# Patient Record
Sex: MALE | Race: WHITE
[De-identification: names, ages, dates, MRNs, and addresses within clinical notes are randomized per-mention and may not be internally consistent; named-entity substitution may affect disease eponyms.]

---

## 2019-08-05 ENCOUNTER — HOSPITAL ENCOUNTER (OUTPATIENT)
Dept: HOSPITAL 18 - NAV RAD | Age: 44
Discharge: HOME | End: 2019-08-05
Payer: COMMERCIAL

## 2019-08-05 DIAGNOSIS — S91.341A: Primary | ICD-10-CM

## 2019-08-05 DIAGNOSIS — L03.115: ICD-10-CM

## 2019-08-05 NOTE — RAD
EXAM: Right foot: 3 views



INDICATIONS: Cellulitis of right foot



COMPARISON: None.



FINDINGS: Postop changes involving the first metatarsal. 2 screws transfix the proximal first metatar
sal. One of these screws appear to be fractured.

Tarsals appear intact. Metatarsals and phalanges appear intact. Mild degenerative changes seen. No fo
adelaida lysis or destructive process.



IMPRESSION: No evidence of osteomyelitis.



Reported By: Darshan Gonzalez 

Electronically Signed:  8/5/2019 2:13 PM

## 2019-12-05 ENCOUNTER — HOSPITAL ENCOUNTER (OUTPATIENT)
Dept: HOSPITAL 18 - NAV RAD | Age: 44
Discharge: HOME | End: 2019-12-05
Payer: COMMERCIAL

## 2019-12-05 DIAGNOSIS — M17.12: Primary | ICD-10-CM

## 2019-12-05 NOTE — RAD
XR Knee Lt 4 View STANDARD:



 12/5/2019 2:28 PM



CLINICAL INDICATION: Arthritis



COMPARISON: None.



FINDINGS:



Bones:  There is mild osteophytes involving the patellofemoral compartment. 



Joints: Mediolateral femoral tibial joint compartments are preserved.. 



Soft Tissue: No acute abnormality..

  

IMPRESSION: 

Mild osteoarthrosis of the left knee predominantly in the patellofemoral compartment.. 



Reported By: Partha Pappas 

Electronically Signed:  12/5/2019 2:41 PM

## 2023-07-24 ENCOUNTER — HOSPITAL ENCOUNTER (INPATIENT)
Dept: HOSPITAL 92 - SURG A | Age: 48
LOS: 9 days | Discharge: HOME | DRG: 621 | End: 2023-08-02
Attending: SPECIALIST | Admitting: SPECIALIST
Payer: COMMERCIAL

## 2023-07-24 VITALS — BODY MASS INDEX: 39.7 KG/M2

## 2023-07-24 DIAGNOSIS — G89.29: ICD-10-CM

## 2023-07-24 DIAGNOSIS — Z79.899: ICD-10-CM

## 2023-07-24 DIAGNOSIS — E66.01: Primary | ICD-10-CM

## 2023-07-24 DIAGNOSIS — Z87.891: ICD-10-CM

## 2023-07-24 DIAGNOSIS — E78.00: ICD-10-CM

## 2023-07-24 DIAGNOSIS — M19.90: ICD-10-CM

## 2023-07-24 DIAGNOSIS — Z98.890: ICD-10-CM

## 2023-07-24 PROCEDURE — A4649 SURGICAL SUPPLIES: HCPCS

## 2023-07-24 PROCEDURE — 85025 COMPLETE CBC W/AUTO DIFF WBC: CPT

## 2023-07-24 PROCEDURE — C9113 INJ PANTOPRAZOLE SODIUM, VIA: HCPCS

## 2023-07-24 PROCEDURE — 36415 COLL VENOUS BLD VENIPUNCTURE: CPT

## 2023-07-24 PROCEDURE — 80053 COMPREHEN METABOLIC PANEL: CPT

## 2023-07-24 PROCEDURE — 88307 TISSUE EXAM BY PATHOLOGIST: CPT

## 2023-07-24 PROCEDURE — 80048 BASIC METABOLIC PNL TOTAL CA: CPT

## 2023-07-24 PROCEDURE — S0020 INJECTION, BUPIVICAINE HYDRO: HCPCS

## 2023-08-01 LAB
ALBUMIN SERPL BCG-MCNC: 4.5 G/DL (ref 3.5–5)
ALP SERPL-CCNC: 78 U/L (ref 40–110)
ALT SERPL W P-5'-P-CCNC: 36 U/L (ref 8–55)
ANION GAP SERPL CALC-SCNC: 13 MMOL/L (ref 10–20)
AST SERPL-CCNC: 27 U/L (ref 5–34)
BILIRUB SERPL-MCNC: 1 MG/DL (ref 0.2–1.2)
BUN SERPL-MCNC: 15 MG/DL (ref 8.9–20.6)
CALCIUM SERPL-MCNC: 9.7 MG/DL (ref 7.8–10.44)
CHLORIDE SERPL-SCNC: 105 MMOL/L (ref 98–107)
CO2 SERPL-SCNC: 26 MMOL/L (ref 22–29)
CREAT CL PREDICTED SERPL C-G-VRATE: 176 ML/MIN (ref 70–130)
GLOBULIN SER CALC-MCNC: 2.6 G/DL (ref 2.4–3.5)
GLUCOSE SERPL-MCNC: 94 MG/DL (ref 70–105)
POTASSIUM SERPL-SCNC: 3.8 MMOL/L (ref 3.5–5.1)
SODIUM SERPL-SCNC: 140 MMOL/L (ref 136–145)

## 2023-08-01 PROCEDURE — 0DB64Z3 EXCISION OF STOMACH, PERCUTANEOUS ENDOSCOPIC APPROACH, VERTICAL: ICD-10-PCS | Performed by: SPECIALIST

## 2023-08-01 RX ADMIN — POTASSIUM CHLORIDE, DEXTROSE MONOHYDRATE AND SODIUM CHLORIDE SCH MLS: 150; 5; 450 INJECTION, SOLUTION INTRAVENOUS at 19:50

## 2023-08-01 RX ADMIN — POTASSIUM CHLORIDE, DEXTROSE MONOHYDRATE AND SODIUM CHLORIDE SCH: 150; 5; 450 INJECTION, SOLUTION INTRAVENOUS at 17:06

## 2023-08-02 VITALS — SYSTOLIC BLOOD PRESSURE: 146 MMHG | TEMPERATURE: 98.1 F | DIASTOLIC BLOOD PRESSURE: 89 MMHG

## 2023-08-02 LAB
ANION GAP SERPL CALC-SCNC: 12 MMOL/L (ref 10–20)
BASOPHILS # BLD AUTO: 0 THOU/UL (ref 0–0.2)
BASOPHILS NFR BLD AUTO: 0.1 % (ref 0–1)
BUN SERPL-MCNC: 11 MG/DL (ref 8.9–20.6)
CALCIUM SERPL-MCNC: 8.8 MG/DL (ref 7.8–10.44)
CHLORIDE SERPL-SCNC: 104 MMOL/L (ref 98–107)
CO2 SERPL-SCNC: 25 MMOL/L (ref 22–29)
CREAT CL PREDICTED SERPL C-G-VRATE: 176 ML/MIN (ref 70–130)
EOSINOPHIL # BLD AUTO: 0 THOU/UL (ref 0–0.7)
EOSINOPHIL NFR BLD AUTO: 0 % (ref 0–10)
GLUCOSE SERPL-MCNC: 114 MG/DL (ref 70–105)
HGB BLD-MCNC: 15 G/DL (ref 14–18)
LYMPHOCYTES NFR BLD AUTO: 6.7 % (ref 21–51)
MCH RBC QN AUTO: 31.1 PG (ref 27–31)
MCV RBC AUTO: 90.7 FL (ref 78–98)
MONOCYTES # BLD AUTO: 1.4 THOU/UL (ref 0.11–0.59)
MONOCYTES NFR BLD AUTO: 7.9 % (ref 0–10)
NEUTROPHILS # BLD AUTO: 15.3 THOU/UL (ref 1.4–6.5)
NEUTROPHILS NFR BLD AUTO: 84.8 % (ref 42–75)
PLATELET # BLD AUTO: 197 10X3/UL (ref 130–400)
POTASSIUM SERPL-SCNC: 4.1 MMOL/L (ref 3.5–5.1)
RBC # BLD AUTO: 4.82 MILL/UL (ref 4.7–6.1)
SODIUM SERPL-SCNC: 137 MMOL/L (ref 136–145)
WBC # BLD AUTO: 18 10X3/UL (ref 4.8–10.8)

## 2023-08-02 RX ADMIN — POTASSIUM CHLORIDE, DEXTROSE MONOHYDRATE AND SODIUM CHLORIDE SCH: 150; 5; 450 INJECTION, SOLUTION INTRAVENOUS at 03:57
